# Patient Record
Sex: FEMALE | Race: BLACK OR AFRICAN AMERICAN | URBAN - METROPOLITAN AREA
[De-identification: names, ages, dates, MRNs, and addresses within clinical notes are randomized per-mention and may not be internally consistent; named-entity substitution may affect disease eponyms.]

---

## 2021-01-07 ENCOUNTER — APPOINTMENT (RX ONLY)
Dept: URBAN - METROPOLITAN AREA CLINIC 44 | Facility: CLINIC | Age: 21
Setting detail: DERMATOLOGY
End: 2021-01-07

## 2021-01-07 DIAGNOSIS — L21.8 OTHER SEBORRHEIC DERMATITIS: ICD-10-CM | Status: INADEQUATELY CONTROLLED

## 2021-01-07 PROCEDURE — 99204 OFFICE O/P NEW MOD 45 MIN: CPT

## 2021-01-07 PROCEDURE — ? FULL BODY SKIN EXAM - DECLINED

## 2021-01-07 PROCEDURE — ? COUNSELING

## 2021-01-07 PROCEDURE — ? PRESCRIPTION

## 2021-01-07 PROCEDURE — ? MEDICATION COUNSELING

## 2021-01-07 RX ORDER — CLOBETASOL PROPIONATE 0.5 MG/ML
SOLUTION TOPICAL QHS
Qty: 1 | Refills: 1 | Status: ERX | COMMUNITY
Start: 2021-01-07

## 2021-01-07 RX ORDER — KETOCONAZOLE 20 MG/ML
SHAMPOO, SUSPENSION TOPICAL AS DIRECTED
Qty: 1 | Refills: 3 | Status: ERX | COMMUNITY
Start: 2021-01-07

## 2021-01-07 RX ORDER — FLUOCINOLONE ACETONIDE 0.11 MG/ML
OIL TOPICAL
Qty: 1 | Refills: 1 | Status: ERX | COMMUNITY
Start: 2021-01-07

## 2021-01-07 RX ADMIN — KETOCONAZOLE: 20 SHAMPOO, SUSPENSION TOPICAL at 00:00

## 2021-01-07 RX ADMIN — CLOBETASOL PROPIONATE: 0.5 SOLUTION TOPICAL at 00:00

## 2021-01-07 RX ADMIN — FLUOCINOLONE ACETONIDE: 0.11 OIL TOPICAL at 00:00

## 2021-01-07 ASSESSMENT — LOCATION DETAILED DESCRIPTION DERM: LOCATION DETAILED: MID-FRONTAL SCALP

## 2021-01-07 ASSESSMENT — LOCATION ZONE DERM: LOCATION ZONE: SCALP

## 2021-01-07 ASSESSMENT — LOCATION SIMPLE DESCRIPTION DERM: LOCATION SIMPLE: ANTERIOR SCALP

## 2021-01-07 NOTE — PROCEDURE: MEDICATION COUNSELING
Xelmilesz Pregnancy And Lactation Text: This medication is Pregnancy Category D and is not considered safe during pregnancy.  The risk during breast feeding is also uncertain.

## 2021-01-07 NOTE — HPI: RASH
What Type Of Note Output Would You Prefer (Optional)?: Bullet Format
How Severe Is Your Rash?: mild
Is This A New Presentation, Or A Follow-Up?: Rash
Additional History: She washes her hair once every three weeks

## 2021-04-22 ENCOUNTER — APPOINTMENT (RX ONLY)
Dept: URBAN - METROPOLITAN AREA CLINIC 44 | Facility: CLINIC | Age: 21
Setting detail: DERMATOLOGY
End: 2021-04-22

## 2021-04-22 DIAGNOSIS — L21.8 OTHER SEBORRHEIC DERMATITIS: ICD-10-CM | Status: STABLE

## 2021-04-22 PROCEDURE — ? MEDICATION COUNSELING

## 2021-04-22 PROCEDURE — ? ADDITIONAL NOTES

## 2021-04-22 PROCEDURE — 99214 OFFICE O/P EST MOD 30 MIN: CPT

## 2021-04-22 PROCEDURE — ? PRESCRIPTION

## 2021-04-22 PROCEDURE — ? COUNSELING

## 2021-04-22 PROCEDURE — ? PRESCRIPTION MEDICATION MANAGEMENT

## 2021-04-22 RX ORDER — CLOBETASOL PROPIONATE 0.5 MG/ML
SOLUTION TOPICAL QHS
Qty: 1 | Refills: 1 | Status: ERX

## 2021-04-22 RX ORDER — FLUOCINOLONE ACETONIDE 0.11 MG/ML
OIL TOPICAL
Qty: 1 | Refills: 1 | Status: ERX

## 2021-04-22 RX ORDER — KETOCONAZOLE 20 MG/ML
SHAMPOO, SUSPENSION TOPICAL AS DIRECTED
Qty: 1 | Refills: 3 | Status: ERX

## 2021-04-22 ASSESSMENT — LOCATION ZONE DERM: LOCATION ZONE: SCALP

## 2021-04-22 ASSESSMENT — LOCATION DETAILED DESCRIPTION DERM: LOCATION DETAILED: MID-FRONTAL SCALP

## 2021-04-22 ASSESSMENT — LOCATION SIMPLE DESCRIPTION DERM: LOCATION SIMPLE: ANTERIOR SCALP

## 2021-04-22 NOTE — PROCEDURE: PRESCRIPTION MEDICATION MANAGEMENT
Detail Level: Zone
Render In Strict Bullet Format?: No
Plan: Improved but not a goal. Encourage patient to remain consistent with regimen also discussed adding in a salicylic acid shampoo to help with blue flakes.Neutrogena Tgel or tsal, bakers pns alternating with Ketoconazole shampoo

## 2021-04-22 NOTE — PROCEDURE: MEDICATION COUNSELING
Request for Klor-con 20 mEq two tablets every day. Last office visit 5/30/17, next office visit 10/26/17. Refills per verbal order from Dr. Dawson Ramires. Tazorac Counseling:  Patient advised that medication is irritating and drying.  Patient may need to apply sparingly and wash off after an hour before eventually leaving it on overnight.  The patient verbalized understanding of the proper use and possible adverse effects of tazorac.  All of the patient's questions and concerns were addressed.

## 2022-10-12 NOTE — PROCEDURE: MEDICATION COUNSELING
Fever    A fever is an increase in the body's temperature. It is usually defined as a temperature of 100°F (38°C) or higher. If your child is older than three months, a brief mild or moderate fever generally has no long-term effect, and it usually does not require treatment. Take medications as directed by your health care provider.    SEEK IMMEDIATE MEDICAL CARE IF YOUR CHILD DEVELOPS THE FOLLOWING SYMPTOMS: shortness of breath, seizure, rash/stiff neck/headache, severe abdominal pain, persistent vomiting, any signs of dehydration, or your child is less than 3 months and has a fever. Rifampin Pregnancy And Lactation Text: This medication is Pregnancy Category C and it isn't know if it is safe during pregnancy. It is also excreted in breast milk and should not be used if you are breast feeding.